# Patient Record
Sex: MALE
[De-identification: names, ages, dates, MRNs, and addresses within clinical notes are randomized per-mention and may not be internally consistent; named-entity substitution may affect disease eponyms.]

---

## 2017-10-20 ENCOUNTER — HOSPITAL ENCOUNTER (OUTPATIENT)
Dept: HOSPITAL 42 - ENDO | Age: 82
Discharge: HOME | End: 2017-10-20
Attending: INTERNAL MEDICINE
Payer: MEDICARE

## 2017-10-20 VITALS
TEMPERATURE: 97.5 F | SYSTOLIC BLOOD PRESSURE: 143 MMHG | DIASTOLIC BLOOD PRESSURE: 59 MMHG | HEART RATE: 48 BPM | RESPIRATION RATE: 16 BRPM | OXYGEN SATURATION: 98 %

## 2017-10-20 VITALS — BODY MASS INDEX: 23.6 KG/M2

## 2017-10-20 DIAGNOSIS — K21.0: ICD-10-CM

## 2017-10-20 DIAGNOSIS — K44.9: ICD-10-CM

## 2017-10-20 DIAGNOSIS — R63.4: ICD-10-CM

## 2017-10-20 DIAGNOSIS — K26.9: ICD-10-CM

## 2017-10-20 DIAGNOSIS — K90.0: Primary | ICD-10-CM

## 2017-10-20 DIAGNOSIS — K29.50: ICD-10-CM

## 2018-02-09 ENCOUNTER — HOSPITAL ENCOUNTER (OUTPATIENT)
Dept: HOSPITAL 42 - ENDO | Age: 83
Discharge: HOME | End: 2018-02-09
Attending: INTERNAL MEDICINE
Payer: MEDICARE

## 2018-02-09 VITALS — BODY MASS INDEX: 23.6 KG/M2

## 2018-02-09 VITALS
OXYGEN SATURATION: 98 % | DIASTOLIC BLOOD PRESSURE: 70 MMHG | SYSTOLIC BLOOD PRESSURE: 166 MMHG | HEART RATE: 43 BPM | RESPIRATION RATE: 16 BRPM | TEMPERATURE: 97.7 F

## 2018-02-09 DIAGNOSIS — K64.8: ICD-10-CM

## 2018-02-09 DIAGNOSIS — K57.30: Primary | ICD-10-CM

## 2018-02-09 PROCEDURE — 88305 TISSUE EXAM BY PATHOLOGIST: CPT

## 2018-02-09 PROCEDURE — 88342 IMHCHEM/IMCYTCHM 1ST ANTB: CPT

## 2018-02-09 PROCEDURE — 45380 COLONOSCOPY AND BIOPSY: CPT

## 2018-04-03 ENCOUNTER — HOSPITAL ENCOUNTER (OUTPATIENT)
Dept: HOSPITAL 42 - ENDO | Age: 83
Discharge: HOME | End: 2018-04-03
Attending: INTERNAL MEDICINE
Payer: MEDICARE

## 2018-04-03 VITALS
SYSTOLIC BLOOD PRESSURE: 144 MMHG | TEMPERATURE: 98 F | OXYGEN SATURATION: 97 % | HEART RATE: 40 BPM | DIASTOLIC BLOOD PRESSURE: 61 MMHG | RESPIRATION RATE: 15 BRPM

## 2018-04-03 VITALS — BODY MASS INDEX: 23.6 KG/M2

## 2018-04-03 DIAGNOSIS — R13.10: ICD-10-CM

## 2018-04-03 DIAGNOSIS — K22.70: ICD-10-CM

## 2018-04-03 DIAGNOSIS — K31.7: Primary | ICD-10-CM

## 2018-04-03 DIAGNOSIS — Z86.73: ICD-10-CM

## 2018-04-03 DIAGNOSIS — K22.2: ICD-10-CM

## 2018-04-03 DIAGNOSIS — K29.50: ICD-10-CM

## 2018-04-03 DIAGNOSIS — K90.0: ICD-10-CM

## 2018-04-03 DIAGNOSIS — I10: ICD-10-CM

## 2018-04-03 PROCEDURE — 88312 SPECIAL STAINS GROUP 1: CPT

## 2018-04-03 PROCEDURE — 88342 IMHCHEM/IMCYTCHM 1ST ANTB: CPT

## 2018-04-03 PROCEDURE — 88305 TISSUE EXAM BY PATHOLOGIST: CPT

## 2018-04-03 PROCEDURE — 43239 EGD BIOPSY SINGLE/MULTIPLE: CPT

## 2018-11-24 ENCOUNTER — HOSPITAL ENCOUNTER (EMERGENCY)
Dept: HOSPITAL 42 - ED | Age: 83
Discharge: HOME | End: 2018-11-24
Payer: MEDICARE

## 2018-11-24 VITALS — TEMPERATURE: 98.2 F

## 2018-11-24 VITALS — OXYGEN SATURATION: 96 % | HEART RATE: 52 BPM | SYSTOLIC BLOOD PRESSURE: 174 MMHG | DIASTOLIC BLOOD PRESSURE: 84 MMHG

## 2018-11-24 VITALS — RESPIRATION RATE: 18 BRPM

## 2018-11-24 VITALS — BODY MASS INDEX: 23.6 KG/M2

## 2018-11-24 DIAGNOSIS — I25.10: ICD-10-CM

## 2018-11-24 DIAGNOSIS — Z95.5: ICD-10-CM

## 2018-11-24 DIAGNOSIS — I10: Primary | ICD-10-CM

## 2018-11-24 NOTE — ED PDOC
Arrival/HPI





- General


Historian: Patient





- History of Present Illness


Narrative History of Present Illness (Text): 





11/24/18 09:55





Patient is a 83 year old male with past medical history of Hypertension, CAD s/p

stent, hyperlipidemia who presented to the emergency department for elevated 

blood pressure. Patient states that he just recently got a BP machine at home. 

He took his medication this morning and took his blood pressure approximately 10

minutes later. His SBP reading was 209 at that time. He called his PMD who 

recommended coming to the ED for evaluation. Patient offers no other complaints 

at this time. He denies headaches, dizziness, cp, palpitations, sob, abdominal 

pain, urinary symptoms. 








Time/Duration: Prior to Arrival


Symptom Onset: Sudden


Symptom Course: Unchanged





<Claritza Hoffmann - Last Filed: 11/24/18 10:22>





<Jax Guadarrama - Last Filed: 11/24/18 11:15>





- General


Chief Complaint: High Blood Pressure


Time Seen by Provider: 11/24/18 09:40





Past Medical History





- Provider Review


Nursing Documentation Reviewed: Yes





- Infectious Disease


Hx of Infectious Diseases: None





- Tetanus Immunization


Tetanus Immunization: Unknown





- Cardiac


Hx Hypertension: Yes


Hx Pacemaker: No





- Neurological


Hx Paralysis: No





- Hematological/Oncological


Hx Blood Transfusions: No





- Musculoskeletal/Rheumatological


Hx Musculoskeletal Disorders: No





- Psychiatric


Hx Emotional Abuse: No


Hx Physical Abuse: No


Hx Substance Use: No





- Surgical History


Hx Cardiac Catheterization: Yes (with stents)





- Anesthesia


Hx Anesthesia: Yes


Hx Anesthesia Reactions: No


Hx Malignant Hyperthermia: No





- Suicidal Assessment


Feels Threatened In Home Enviroment: No





<Claritza Hoffmann - Last Filed: 11/24/18 10:22>





Family/Social History





- Physician Review


Nursing Documentation Reviewed: Yes


Smoking Status: Never Smoked


Hx Alcohol Use: Yes (BEER OCCASIONALLY)


Hx Substance Use: No





<Claritza Hoffmann - Last Filed: 11/24/18 10:22>


Family/Social History: No Known Family HX





<Jax Guadarrama - Last Filed: 11/24/18 11:15>





Allergies/Home Meds





<Claritza Hoffmann - Last Filed: 11/24/18 10:22>





<Jax Guadarrama - Last Filed: 11/24/18 11:15>


Allergies/Adverse Reactions: 


Allergies





No Known Allergies Allergy (Verified 01/16/17 11:09)


   








Home Medications: 


                                    Home Meds











 Medication  Instructions  Recorded  Confirmed


 


RX: Metoprolol Succinate 25 mg PO DAILY 06/20/12 11/24/18


 


Ferrous Gluconate [Fergon] 270 mg PO DAILY 10/13/17 11/24/18


 


Folic Acid 1 mg PO BID 10/13/17 11/24/18


 


Tamsulosin [Flomax] 0.4 mg PO DAILY 10/13/17 11/24/18


 


RX: Omeprazole 20 mg PO DAILY 10/20/17 11/24/18


 


Aspirin [Ecotrin] 81 mg PO DAILY 01/03/18 11/24/18


 


Multivit-Minerals/FA/Lycopene [One 1 tab PO DAILY 01/03/18 11/24/18





Daily For Men Tablet]   


 


RX: Atorvastatin [Lipitor] 20 mg PO QOTHERDAY 01/03/18 11/24/18


 


RX: Docusate Sodium [Stool 100 mg PO DAILY 01/03/18 11/24/18





Softener]   


 


RX: Losartan Potassium [Cozaar] 100 mg PO DAILY 01/03/18 11/24/18














Review of Systems





- Physician Review


All systems were reviewed & negative as marked: Yes





- Review of Systems


Constitutional: Normal.  absent: Fatigue


Eyes: Normal.  absent: Vision Changes


Respiratory: Normal.  absent: SOB, Cough


Cardiovascular: Normal.  absent: Chest Pain, Palpitations, Edema


Gastrointestinal: Normal.  absent: Abdominal Pain, Constipation, Diarrhea, 

Nausea, Vomiting


Genitourinary Male: Normal.  absent: Dysuria, Frequency, Hematuria


Musculoskeletal: Normal.  absent: Back Pain, Neck Pain


Skin: Normal


Neurological: Normal.  absent: Headache, Dizziness, Focal Weakness





<Claritza Hoffmann - Last Filed: 11/24/18 10:22>





Physical Exam


Vital Signs Reviewed: Yes





Vital Signs











  Temp Pulse Resp BP Pulse Ox


 


 11/24/18 09:33  98.4 F  56 L  18  162/80 H  98


 


 11/24/18 09:17  98.4 F  56 L  18  162/80 H  98











Temperature: Afebrile


Blood Pressure: Hypertensive


Pulse: Bradycardic


Respiratory Rate: Normal


Appearance: Positive for: Well-Appearing, Non-Toxic, Comfortable


Pain Distress: None


Mental Status: Positive for: Alert and Oriented X 3





- Systems Exam


Head: Present: Atraumatic, Normocephalic


Pupils: Present: PERRL


Extroacular Muscles: Present: EOMI


Conjunctiva: Present: Normal


Mouth: Present: Moist Mucous Membranes


Nose (Internal): Present: Normal Inspection


Neck: Present: Normal Range of Motion


Respiratory/Chest: Present: Clear to Auscultation, Good Air Exchange.  No: 

Respiratory Distress, Accessory Muscle Use


Cardiovascular: Present: Regular Rate and Rhythm, Murmurs, Normal S1, S2


Abdomen: Present: Distention (mildly distended), Normal Bowel Sounds.  No: 

Tenderness, Peritoneal Signs, Rebound, Guarding


Back: Present: Normal Inspection.  No: CVA Tenderness


Upper Extremity: Present: Normal Inspection


Lower Extremity: Present: Normal Inspection


Neurological: Present: CN II-XII Intact


Skin: Present: Warm, Dry, Normal Color


Psychiatric: Present: Alert, Oriented x 3





<Claritza Hoffmann - Last Filed: 11/24/18 10:22>





Vital Signs











  Temp Pulse Resp BP Pulse Ox


 


 11/24/18 10:29  98.2 F  52 L  18  174/84 H  96


 


 11/24/18 09:33  98.4 F  56 L  18  162/80 H  98


 


 11/24/18 09:17  98.4 F  56 L  18  162/80 H  98














<Jax Guadarrama - Last Filed: 11/24/18 11:15>





Medical Decision Making


ED Course and Treatment: 





11/24/18 10:01





Patient seen and examined at bedside. Patient is asymptomatic, offering no 

complaints at this time. 





11/24/18 10:28





Patient to be discharged home. Instructed to follow up with PMD within 2-3 days.

 





<Claritza Hoffmann - Last Filed: 11/24/18 10:22>


ED Course and Treatment: 








11/24/18 10:05


Seen and examined with resident. 83 year old male presents with elevated blood 

pressure, spontaneously improving. On exam, no distress.








<Jax Guadarrama - Last Filed: 11/24/18 11:15>





- PA / NP / Resident Statement


MD/DO has reviewed & agrees with the documentation as recorded.


MD/DO has examined the patient and agrees with the treatment plan.





- Scribe Statement


The provider has reviewed the documentation as recorded by the Dominiqueibveronica Crabtree





All medical record entries made by the Scribe were at my direction and 

personally dictated by me. I have reviewed the chart and agree that the record 

accurately reflects my personal performance of the history, physical exam, 

medical decision making, and the department course for this patient. I have also

 personally directed, reviewed, and agree with the discharge instructions and 

disposition.








<Jax Guadarrama - Last Filed: 11/24/18 11:15>





Disposition/Present on Arrival





- Present on Arrival


Any Indicators Present on Arrival: No


History of DVT/PE: No


History of Uncontrolled Diabetes: No


Urinary Catheter: No


History of Decub. Ulcer: No


History Surgical Site Infection Following: CABG - Mediastinitis, None





- Disposition


Have Diagnosis and Disposition been Completed?: Yes


Disposition Time: 10:19


Patient Plan: Discharge





<ShunClaritza - Last Filed: 11/24/18 10:22>





<Jax Guadarrama - Last Filed: 11/24/18 11:15>





- Disposition


Diagnosis: 


 Hypertension





Disposition: HOME/ ROUTINE


Condition: GOOD


Discharge Instructions (ExitCare):  High Blood Pressure in Adults, High Blood Pr

essure (DC), Low Salt Diet, High Blood Pressure Emergencies


Additional Instructions: 


- Please follow up with PMD within 2-3 days for BP check and follow up


- If any worsening of symptoms, return to the emergency department





Referrals: 


Mikel Escalante MD [Family Provider] - Follow up with primary


Forms:  [facility] (English)

## 2019-02-11 ENCOUNTER — HOSPITAL ENCOUNTER (INPATIENT)
Dept: HOSPITAL 42 - ED | Age: 84
LOS: 2 days | Discharge: NURSING, REHAB OR LONG-TERM CARE | DRG: 66 | End: 2019-02-13
Attending: INTERNAL MEDICINE | Admitting: INTERNAL MEDICINE
Payer: MEDICARE

## 2019-02-11 VITALS — BODY MASS INDEX: 26.2 KG/M2

## 2019-02-11 VITALS — OXYGEN SATURATION: 99 %

## 2019-02-11 DIAGNOSIS — Z79.899: ICD-10-CM

## 2019-02-11 DIAGNOSIS — Z79.82: ICD-10-CM

## 2019-02-11 DIAGNOSIS — K90.0: ICD-10-CM

## 2019-02-11 DIAGNOSIS — E78.5: ICD-10-CM

## 2019-02-11 DIAGNOSIS — I69.392: ICD-10-CM

## 2019-02-11 DIAGNOSIS — I25.10: ICD-10-CM

## 2019-02-11 DIAGNOSIS — I10: ICD-10-CM

## 2019-02-11 DIAGNOSIS — I63.9: Primary | ICD-10-CM

## 2019-02-11 DIAGNOSIS — Z95.5: ICD-10-CM

## 2019-02-11 DIAGNOSIS — R29.700: ICD-10-CM

## 2019-02-11 DIAGNOSIS — I65.23: ICD-10-CM

## 2019-02-11 LAB
ALBUMIN SERPL-MCNC: 3.8 G/DL (ref 3–4.8)
ALBUMIN/GLOB SERPL: 1.4 {RATIO} (ref 1.1–1.8)
ALT SERPL-CCNC: 17 U/L (ref 7–56)
APTT BLD: 29.6 SECONDS (ref 26.9–38.3)
AST SERPL-CCNC: 31 U/L (ref 17–59)
BASOPHILS # BLD AUTO: 0.01 K/MM3 (ref 0–2)
BASOPHILS NFR BLD: 0.1 % (ref 0–3)
BUN SERPL-MCNC: 26 MG/DL (ref 7–21)
CALCIUM SERPL-MCNC: 8.9 MG/DL (ref 8.4–10.5)
EOSINOPHIL # BLD: 0.1 10*3/UL (ref 0–0.7)
EOSINOPHIL NFR BLD: 0.9 % (ref 1.5–5)
ERYTHROCYTE [DISTWIDTH] IN BLOOD BY AUTOMATED COUNT: 14.2 % (ref 11.5–14.5)
GFR NON-AFRICAN AMERICAN: 53
HDLC SERPL-MCNC: 32 MG/DL (ref 29–60)
HGB BLD-MCNC: 15.2 G/DL (ref 14–18)
INR PPP: 0.99
LDLC SERPL-MCNC: 74 MG/DL (ref 0–129)
LYMPHOCYTES # BLD: 2.7 10*3/UL (ref 1.2–3.4)
LYMPHOCYTES NFR BLD AUTO: 32 % (ref 22–35)
MCH RBC QN AUTO: 28.8 PG (ref 25–35)
MCHC RBC AUTO-ENTMCNC: 32.8 G/DL (ref 31–37)
MCV RBC AUTO: 87.9 FL (ref 80–105)
MONOCYTES # BLD AUTO: 0.7 10*3/UL (ref 0.1–0.6)
MONOCYTES NFR BLD: 7.7 % (ref 1–6)
PLATELET # BLD: 199 10^3/UL (ref 120–450)
PMV BLD AUTO: 10.4 FL (ref 7–11)
PROTHROMBIN TIME: 11.2 SECONDS (ref 9.4–12.5)
RBC # BLD AUTO: 5.28 10^6/UL (ref 3.5–6.1)
TROPONIN I SERPL-MCNC: < 0.01 NG/ML
WBC # BLD AUTO: 8.6 10^3/UL (ref 4.5–11)

## 2019-02-11 RX ADMIN — METOPROLOL SUCCINATE SCH: 50 TABLET, EXTENDED RELEASE ORAL at 10:04

## 2019-02-11 RX ADMIN — PANTOPRAZOLE SODIUM SCH: 40 TABLET, DELAYED RELEASE ORAL at 10:51

## 2019-02-11 NOTE — CARD
--------------- APPROVED REPORT --------------





Date of service: 02/11/2019



EKG Measurement

Heart [id number]

[id number]

[id number]

[id number]

[id number]



<Conclusion>

Sinus bradycardia with premature atrial complexes with aberrant 

conduction

Abnormal ECG

## 2019-02-11 NOTE — ED PDOC
Arrival/HPI





- General


Chief Complaint: Weakness/Neurological Deficit


Time Seen by Provider: 02/11/19 04:51


Historian: Patient, Spouse





- History of Present Illness


Narrative History of Present Illness (Text): 


02/11/19 04:57


Alexandr Neff is an 83 year old male, whose past medical history includes CAD 

with cardiac stents, hypertension, hyperlipidemia, and Celiac's disease, who 

presents to the Emergency department accompanied by wife complaining of 

paresthesias. Patient states while driving at 14:30 yesterday he started 

experiencing paresthesias to his right arm and right leg. Patient denies any 

numbness, headache, dizziness, vision changes, chest pain, shortness of breath, 

nausea, vomiting, back pain, neck pain, or any other complaints. 


Symptom Onset: Gradual


Symptom Course: Unchanged


Activities at Onset: Light


Context: Home





Past Medical History





- Provider Review


Nursing Documentation Reviewed: Yes





- Infectious Disease


Hx of Infectious Diseases: None





- Tetanus Immunization


Tetanus Immunization: Unknown





- Cardiac


Hx Hypertension: Yes


Hx Pacemaker: No





- Neurological


Hx Paralysis: No





- Hematological/Oncological


Hx Blood Transfusions: No





- Musculoskeletal/Rheumatological


Hx Musculoskeletal Disorders: No





- Psychiatric


Hx Emotional Abuse: No


Hx Physical Abuse: No


Hx Substance Use: No





- Surgical History


Hx Cardiac Catheterization: Yes (with stents)





- Anesthesia


Hx Anesthesia: Yes


Hx Anesthesia Reactions: No


Hx Malignant Hyperthermia: No





- Suicidal Assessment


Feels Threatened In Home Enviroment: No





Family/Social History





- Physician Review


Nursing Documentation Reviewed: Yes


Family/Social History: Unknown Family HX


Smoking Status: Never Smoked


Hx Alcohol Use: Yes (BEER OCCASIONALLY)


Hx Substance Use: No





Allergies/Home Meds


Allergies/Adverse Reactions: 


Allergies





No Known Allergies Allergy (Verified 02/11/19 04:39)


   








Home Medications: 


                                    Home Meds











 Medication  Instructions  Recorded  Confirmed


 


Metoprolol Succinate 50 mg PO DAILY 06/20/12 02/11/19


 


Ferrous Gluconate [Fergon] 270 mg PO DAILY 10/13/17 02/11/19


 


Folic Acid 1 mg PO BID 10/13/17 02/11/19


 


Tamsulosin [Flomax] 0.4 mg PO DAILY 10/13/17 02/11/19


 


Omeprazole 20 mg PO DAILY 10/20/17 02/11/19


 


Aspirin [Ecotrin] 81 mg PO DAILY 01/03/18 02/11/19


 


Atorvastatin [Lipitor] 20 mg PO QOTHERDAY 01/03/18 02/11/19


 


Docusate Sodium [Stool Softener] 100 mg PO DAILY 01/03/18 02/11/19


 


Losartan Potassium [Cozaar] 100 mg PO DAILY 01/03/18 02/11/19


 


Multivit-Minerals/FA/Lycopene [One 1 tab PO DAILY 01/03/18 02/11/19





Daily For Men Tablet]   


 


amLODIPine [Norvasc] 10 mg PO DAILY 02/11/19 02/11/19














Review of Systems





- Physician Review


All systems were reviewed & negative as marked: Yes





- Review of Systems


Constitutional: Normal.  absent: Fevers


Eyes: Normal


ENT: Normal


Respiratory: Normal.  absent: SOB, Cough


Cardiovascular: Normal.  absent: Chest Pain


Gastrointestinal: Normal.  absent: Abdominal Pain, Diarrhea, Nausea, Vomiting


Genitourinary Male: Normal


Musculoskeletal: Normal


Skin: Normal


Neurological: Normal


Endocrine: Normal


Hemo/Lymphatic: Normal


Psychiatric: Normal





Physical Exam


Vital Signs Reviewed: Yes





Vital Signs











  Temp Pulse Resp BP Pulse Ox


 


 02/11/19 04:50  97.6 F    


 


 02/11/19 04:47   53 L  18  145/77  97











Temperature: Afebrile


Blood Pressure: Normal


Pulse: Regular


Respiratory Rate: Normal


Appearance: Positive for: Well-Appearing, Non-Toxic, Comfortable


Pain Distress: None


Mental Status: Positive for: Alert and Oriented X 3


Finger Stick Blood Glucose: 142





- Systems Exam


Head: Present: Atraumatic, Normocephalic


Pupils: Present: PERRL


Extroacular Muscles: Present: EOMI


Conjunctiva: Present: Normal


Mouth: Present: Moist Mucous Membranes


Neck: Present: Normal Range of Motion


Respiratory/Chest: Present: Clear to Auscultation, Good Air Exchange.  No: 

Respiratory Distress, Accessory Muscle Use


Cardiovascular: Present: Regular Rate and Rhythm, Normal S1, S2.  No: Murmurs


Abdomen: No: Tenderness, Distention, Peritoneal Signs


Back: Present: Normal Inspection


Upper Extremity: Present: Normal Inspection, Normal ROM, NORMAL PULSES, 

Neurovascularly Intact, Capillary Refill < 2s.  No: Cyanosis, Edema, Tenderness,

 Swelling, Erythema, Temperature Abnormalties, Deformity


Lower Extremity: Present: Normal Inspection.  No: Edema


Neurological: Present: GCS=15, CN II-XII Intact, Speech Normal, Motor Func 

Grossly Intact, Normal Sensory Function, Normal Cerebellar Funct, Memory Normal


Skin: Present: Warm, Dry, Normal Color.  No: Rashes


Psychiatric: Present: Alert, Oriented x 3, Normal Insight, Normal Concentration





Medical Decision Making


ED Course and Treatment: 


02/11/19 04:57


Impression:


83 year old male complaining of right arm and right leg paresthesias since 14:30

 yesterday.





Plan:


-- CT Head w/o contrast


-- EKG


-- Chest X-ray


-- Labs, lipid panel, troponin


-- Reassess and disposition





Progress Notes:


Reviewed EKG, sinus bradycardia at 52 bpm. Occasional PAC. Septal infarct. No 

acute changes.





02/11/19 06:00


Reviewed Chest X-ray, shows no acute processes.


02/11/19 06:32


Case was discussed with .Accepts to his service/ on consult.





- EKG Interpretation


Interpreted by ED Physician: Yes


Type: 12 lead EKG





NIHSS Scale ([place])


Time Performed: 05:00





- How Severe is the Stoke


  ** Baseline


Level of Consciousness: 0=Alert


LOC to Questions: 0=Both comments correct


LOC to commands: 0=Obeys both correctly


Best Gaze: 0=Normal


Visual: 0=No visual loss


Facial: 0=Normal


Motor Arm - Left: 0=No drift


Motor Arm - Right: 0=No drift


Motor Leg - Left: 0=No drift


Motor Leg - Right: 0=No drift


Limb Ataxia: 0=Absent


Sensory: 0=Normal


Best Language: 0=No aphasia


Dysarthia: 0=Normal articulation


Extinction & Inattention (Neglect): 0=Normal, no object


Score: 0


Risk Level: No Stroke Risk





- Scribe Statement


The provider has reviewed the documentation as recorded by the Asad Pedraza





Provider Scribe Attestation:


All medical record entries made by the Dominiqueibveronica were at my direction and 

personally dictated by me. I have reviewed the chart and agree that the record 

accurately reflects my personal performance of the history, physical exam, 

medical decision making, and the department course for this patient. I have also

 personally directed, reviewed, and agree with the discharge instructions and 

disposition.








Disposition/Present on Arrival





- Present on Arrival


Any Indicators Present on Arrival: No


History of DVT/PE: No


History of Uncontrolled Diabetes: No


Urinary Catheter: No


History of Decub. Ulcer: No


History Surgical Site Infection Following: CABG - Mediastinitis, None





- Disposition


Have Diagnosis and Disposition been Completed?: Yes


Diagnosis: 


 Paresthesias/numbness





Disposition: HOSPITALIZED


Disposition Time: 06:32


Patient Plan: Observation


Condition: STABLE


Forms:  [employer] (English)

## 2019-02-11 NOTE — US
PROCEDURE:  Bilateral carotid artery duplex ultrasound 



HISTORY:

Carotid stenosis TIA



PHYSICIAN(S):  Jeffery Powers MD.



TECHNIQUE:

Duplex sonography and color-flow Doppler were used to evaluate the 

carotid bifurcations and limited segments of the vertebral arteries 

bilaterally.



FINDINGS:

There is mild smooth heterogeneous plaque noted at the carotid 

bifurcations bilaterally. The peak systolic velocity in the proximal 

right internal carotid artery is 82 cm/sec. This corresponds to a 20 

to 39% proximal right ICA stenosis. Normal systolic velocities are 

noted in the proximal right external carotid artery. There is 

antegrade flow in the right vertebral artery.



The peak systolic velocity in the proximal left internal carotid 

artery is 78 cm/sec. This corresponds to a 20 to 39% proximal left 

ICA stenosis. Normal systolic velocities are noted in the proximal 

left external carotid artery. There is antegrade flow in the left 

vertebral artery.



IMPRESSION:

1. Bilateral 20-39% proximal ICA stenoses.



2. Antegrade flow in both vertebral arteries.

## 2019-02-11 NOTE — CT
Date of service: 



02/11/2019



PROCEDURE:  CT HEAD WITHOUT CONTRAST.



HISTORY:

numbness



COMPARISON:

None available.



TECHNIQUE:

Axial computed tomography images were obtained through the head/brain 

without intravenous contrast.  



Radiation dose:



Total exam DLP = 867.26 mGy-cm.



This CT exam was performed using one or more of the following dose 

reduction techniques: Automated exposure control, adjustment of the 

mA and/or kV according to patient size, and/or use of iterative 

reconstruction technique.



FINDINGS:



HEMORRHAGE:

No intracranial hemorrhage. 



BRAIN:

No mass effect or edema.  Chronic microvascular changes are seen.  

There is moderate atrophy.



VENTRICLES:

Unremarkable. No hydrocephalus. 



CALVARIUM:

Unremarkable.



PARANASAL SINUSES:

Unremarkable as visualized. No significant inflammatory changes.



MASTOID AIR CELLS:

Unremarkable as visualized. No inflammatory changes.



OTHER FINDINGS:

The report concurs with the preliminary [facility] report



IMPRESSION:

No acute intracranial findings

## 2019-02-11 NOTE — RAD
Date of service: 



02/11/2019



HISTORY:

 medical clearance 



COMPARISON:

10/02/2014. 



FINDINGS:



LUNGS:

No active pulmonary disease.



PLEURA:

No significant pleural effusion identified, no pneumothorax apparent.



CARDIOVASCULAR:

Atherosclerotic calcifications identified primarily aortic arch.



 No radiographic findings to suggest acute or significant 

cardiovascular disease.



OSSEOUS STRUCTURES:

No significant abnormalities.



VISUALIZED UPPER ABDOMEN:

Normal.



OTHER FINDINGS:

None.



IMPRESSION:

No active disease. No significant interval change compared to the 

prior examination(s).

## 2019-02-11 NOTE — CON
DATE:  02/11/2019



HISTORY OF PRESENT ILLNESS:  This is an 83-year-old male with past medical

of coronary artery disease, cardiac stents, hypertension, hyperlipidemia,

and celiac disease.  He came to the hospital with his wife with complaint

of numbness of right upper extremity and right lower extremity.  No neck

pain.  No back pain.  Denies any dizziness.  No visual changes.



PAST MEDICAL HISTORY:  As above.



SOCIAL HISTORY:  Does not smoke.  Does not drink.



ALLERGIES:  NO KNOWN DRUG ALLERGIES.



HOME MEDICATIONS:  Metoprolol, Flomax, omeprazole, Ecotrin, Lipitor,

Cozaar, and Norvasc.



REVIEW OF SYSTEMS:  A 10-point review of systems was negative.



PHYSICAL EXAMINATION:

VITAL SIGNS:  Blood pressure 145/77.

HEENT:  Normocephalic and atraumatic.

NECK:  Supple.

NEUROLOGIC:  Awake, alert and oriented x3.  No aphasia.  Cranial nerves II

through XII were tested.  Pupils reactive.  EOM intact.  No facial

asymmetry.  Tongue midline.  Motor examination; moves all the extremities

equally.  Tone normal.  Deep tendon reflexes 1+.  Plantars are downgoing. 

Sensory appears intact.  Cerebellar gait deferred.



IMPRESSION AND PLAN:  Right-sided paraesthesia.  CAT scan of the head was

negative.  Workup in progress, we will find out and continue present

management and continue aspirin.







__________________________________________

Jun Faulkner MD





DD:  02/11/2019 13:29:57

DT:  02/11/2019 15:15:30

Job # [id number]

## 2019-02-11 NOTE — CP.PCM.HP
<Umesh Flood - Last Filed: 19 14:52>





History of Present Illness





- History of Present Illness


History of Present Illness: 





H&P for Dr. Schneider:





83-year-old male with past medical history of CVA (L sided residual facial 

droop) , CAD with stents, hypertension, hyperlipidemia, celiac disease presents 

complaining of weakness and paresthesias of his right side.  Patient states that

she was riding a treadmill yesterday afternoon and was doing fine.  Patient 

subsequently went driving towards the park when he started feeling right-sided 

symptoms including R sided paresthesias and weakness.  Patient denies taking 

anything for this.  When his symptoms did not go away patient decided to come to

the ED afraid that he may have a stroke. Patient denies any falls or hitting his

head. Patient denies any headache, dizziness, visual changes, shortness of 

breath, chest pain, palpitations, abdominal pain, nausea, vomiting, or urinary 

symptoms.





12 point ROS performed and negative other than stated above. 





PMH: As above


PSH: Denies


Allergies: No known allergies


SH: Denies any drinking, smoking, or drugs


FH: Both his parents  of cancer, father with lung cancer, mother with 

ovarian cancer









































Present on Admission





- Present on Admission


Any Indicators Present on Admission: No





Review of Systems





- Review of Systems


All systems: reviewed and no additional remarkable complaints except





Past Patient History





- Infectious Disease


Hx of Infectious Diseases: None





- Tetanus Immunizations


Tetanus Immunization: Unknown





- Past Social History


Smoking Status: Never Smoked





- CARDIAC


Hx Hypertension: Yes


Hx Pacemaker: No





- NEUROLOGICAL


Hx Paralysis: No





- HEMATOLOGICAL/ONCOLOGICAL


Hx Blood Transfusions: No





- MUSCULOSKELETAL/RHEUMATOLOGICAL


Hx Musculoskeletal Disorders: No





- PSYCHIATRIC


Hx Emotional Abuse: No


Hx Physical Abuse: No


Hx Substance Use: No





- SURGICAL HISTORY


Hx Cardiac Catheterization: Yes (with stents)





- ANESTHESIA


Hx Anesthesia: Yes


Hx Anesthesia Reactions: No


Hx Malignant Hyperthermia: No





Meds


Allergies/Adverse Reactions: 


                                    Allergies











Allergy/AdvReac Type Severity Reaction Status Date / Time


 


No Known Allergies Allergy   Verified 19 04:39














Physical Exam





- Constitutional


Appears: No Acute Distress





- Head Exam


Head Exam: ATRAUMATIC, NORMOCEPHALIC





- Eye Exam


Eye Exam: EOMI





- ENT Exam


ENT Exam: Mucous Membranes Moist





- Respiratory Exam


Respiratory Exam: Clear to Auscultation Bilateral.  absent: Rales, Wheezes





- Cardiovascular Exam


Cardiovascular Exam: REGULAR RHYTHM, +S1, +S2





- GI/Abdominal Exam


GI & Abdominal Exam: Normal Bowel Sounds, Soft.  absent: Distended





- Extremities Exam


Extremities exam: Negative for: calf tenderness, pedal edema





- Neurological Exam


Neurological exam: Alert, CN II-XII Intact, Oriented x3


Additional comments: 





L sided residual facial droop from prior stroke. 


Motor strength R upper and lower ext 4/5 


                     L upper and lower ext 5/5


Sensation intact in all extremities 








- Psychiatric Exam


Psychiatric exam: Normal Mood





- Skin


Skin Exam: Dry, Warm





Results





- Vital Signs


Recent Vital Signs: 





                                Last Vital Signs











Temp  97.7 F   19 07:29


 


Pulse  50 L  19 12:58


 


Resp  16   19 12:58


 


BP  137/57 L  19 12:58


 


Pulse Ox  97   19 12:58














- Labs


Result Diagrams: 


                                 19 05:08





                                 19 05:08


Labs: 





                         Laboratory Results - last 24 hr











  19





  05:08 05:08 05:08


 


WBC  8.6  


 


RBC  5.28  


 


Hgb  15.2  


 


Hct  46.4  


 


MCV  87.9  


 


MCH  28.8  


 


MCHC  32.8  


 


RDW  14.2  


 


Plt Count  199  


 


MPV  10.4  


 


Neut % (Auto)  59.3  


 


Lymph % (Auto)  32.0  


 


Mono % (Auto)  7.7 H  


 


Eos % (Auto)  0.9 L  


 


Baso % (Auto)  0.1  


 


Lymph # (Auto)  2.7  


 


Mono # (Auto)  0.7 H  


 


Eos # (Auto)  0.1  


 


Baso # (Auto)  0.01  


 


Absolute Neuts (auto)  5.07  


 


PT   11.2 


 


INR   0.99 


 


APTT   29.6 


 


Sodium    136


 


Potassium    4.7


 


Chloride    105


 


Carbon Dioxide    24


 


Anion Gap    12


 


BUN    26 H


 


Creatinine    1.3


 


Est GFR ( Amer)    > 60


 


Est GFR (Non-Af Amer)    53


 


Random Glucose    143 H


 


Hemoglobin A1c   


 


Calcium    8.9


 


Total Bilirubin    0.6


 


AST    31


 


ALT    17


 


Alkaline Phosphatase    87


 


Troponin I    < 0.01


 


Total Protein    6.5


 


Albumin    3.8


 


Globulin    2.7


 


Albumin/Globulin Ratio    1.4


 


Triglycerides    124


 


Cholesterol    126 L


 


LDL Cholesterol Direct    74


 


HDL Cholesterol    32














  19





  05:08


 


WBC 


 


RBC 


 


Hgb 


 


Hct 


 


MCV 


 


MCH 


 


MCHC 


 


RDW 


 


Plt Count 


 


MPV 


 


Neut % (Auto) 


 


Lymph % (Auto) 


 


Mono % (Auto) 


 


Eos % (Auto) 


 


Baso % (Auto) 


 


Lymph # (Auto) 


 


Mono # (Auto) 


 


Eos # (Auto) 


 


Baso # (Auto) 


 


Absolute Neuts (auto) 


 


PT 


 


INR 


 


APTT 


 


Sodium 


 


Potassium 


 


Chloride 


 


Carbon Dioxide 


 


Anion Gap 


 


BUN 


 


Creatinine 


 


Est GFR ( Amer) 


 


Est GFR (Non-Af Amer) 


 


Random Glucose 


 


Hemoglobin A1c  6.2


 


Calcium 


 


Total Bilirubin 


 


AST 


 


ALT 


 


Alkaline Phosphatase 


 


Troponin I 


 


Total Protein 


 


Albumin 


 


Globulin 


 


Albumin/Globulin Ratio 


 


Triglycerides 


 


Cholesterol 


 


LDL Cholesterol Direct 


 


HDL Cholesterol 














Assessment & Plan





- Assessment and Plan (Free Text)


Assessment: 





1. R sided weakness and parathesia r/o CVA vs TIA 


2. CAD with stents


3  Hypertension


4. Hyperlipidemia


5. Celiac disease








Patient is still complaining of right-sided symptoms.  Continue with aspirin 

81mg for history of CAD, and CVA.  Follow-up with echocardiogram and carotid 

artery ultrasound ordered.   Follow-up with neurology consult and 

recommendations.  Continue with Lipitor for his hyperlipidemia.  Continue with 

metoprolol and losartan for his hypertension.  Patient had a CT of the head 

which was negative.  Patient had a chest x-ray which was negative as well. 

Continue with physical therapy.  Continue with heart healthy and gluten-free 

diet.  Continue with Protonix.  Continue to monitor for any changes.





Case and plan was reviewed and discussed with Dr. Schneider. 





<Clayton Schneider - Last Filed: 19 16:56>





Results





- Vital Signs


Recent Vital Signs: 





                                Last Vital Signs











Temp  97.7 F   19 07:29


 


Pulse  50 L  19 12:58


 


Resp  16   19 12:58


 


BP  137/57 L  19 12:58


 


Pulse Ox  97   19 12:58














- Labs


Result Diagrams: 


                                 19 05:08





                                 19 05:08


Labs: 





                         Laboratory Results - last 24 hr











  19





  05:08 05:08 05:08


 


WBC  8.6  


 


RBC  5.28  


 


Hgb  15.2  


 


Hct  46.4  


 


MCV  87.9  


 


MCH  28.8  


 


MCHC  32.8  


 


RDW  14.2  


 


Plt Count  199  


 


MPV  10.4  


 


Neut % (Auto)  59.3  


 


Lymph % (Auto)  32.0  


 


Mono % (Auto)  7.7 H  


 


Eos % (Auto)  0.9 L  


 


Baso % (Auto)  0.1  


 


Lymph # (Auto)  2.7  


 


Mono # (Auto)  0.7 H  


 


Eos # (Auto)  0.1  


 


Baso # (Auto)  0.01  


 


Absolute Neuts (auto)  5.07  


 


PT   11.2 


 


INR   0.99 


 


APTT   29.6 


 


Sodium    136


 


Potassium    4.7


 


Chloride    105


 


Carbon Dioxide    24


 


Anion Gap    12


 


BUN    26 H


 


Creatinine    1.3


 


Est GFR ( Amer)    > 60


 


Est GFR (Non-Af Amer)    53


 


Random Glucose    143 H


 


Hemoglobin A1c   


 


Calcium    8.9


 


Total Bilirubin    0.6


 


AST    31


 


ALT    17


 


Alkaline Phosphatase    87


 


Troponin I    < 0.01


 


Total Protein    6.5


 


Albumin    3.8


 


Globulin    2.7


 


Albumin/Globulin Ratio    1.4


 


Triglycerides    124


 


Cholesterol    126 L


 


LDL Cholesterol Direct    74


 


HDL Cholesterol    32














  19





  05:08


 


WBC 


 


RBC 


 


Hgb 


 


Hct 


 


MCV 


 


MCH 


 


MCHC 


 


RDW 


 


Plt Count 


 


MPV 


 


Neut % (Auto) 


 


Lymph % (Auto) 


 


Mono % (Auto) 


 


Eos % (Auto) 


 


Baso % (Auto) 


 


Lymph # (Auto) 


 


Mono # (Auto) 


 


Eos # (Auto) 


 


Baso # (Auto) 


 


Absolute Neuts (auto) 


 


PT 


 


INR 


 


APTT 


 


Sodium 


 


Potassium 


 


Chloride 


 


Carbon Dioxide 


 


Anion Gap 


 


BUN 


 


Creatinine 


 


Est GFR ( Amer) 


 


Est GFR (Non-Af Amer) 


 


Random Glucose 


 


Hemoglobin A1c  6.2


 


Calcium 


 


Total Bilirubin 


 


AST 


 


ALT 


 


Alkaline Phosphatase 


 


Troponin I 


 


Total Protein 


 


Albumin 


 


Globulin 


 


Albumin/Globulin Ratio 


 


Triglycerides 


 


Cholesterol 


 


LDL Cholesterol Direct 


 


HDL Cholesterol 














Assessment & Plan





- Assessment and Plan (Free Text)


Assessment: 





Pt seen and examined by me. I have reviewed the note of the medical resident and

I agree with it. I have discussed the assessment and plan with the resident. I 

have reviewed the medications and the last labs. Pt with R sided tingling with 

no weakness. He will need to have an evaluation for a TIA. I dont think he has a

TIA but may have a neuropathy. He denies back pain. He is on ASA for his CAD. He

will need Echo and Cartotid US for evaluation. CT of the head was negative. He 

will continue with Losartan for his HTN. He will be on Lipitor for his dysl

ipidemia. I spoke to his wife and updated her on the plan of care. Will need 

gluten free diet. Will get Neuro evaluation.

## 2019-02-12 VITALS — RESPIRATION RATE: 18 BRPM

## 2019-02-12 LAB
ALBUMIN SERPL-MCNC: 3.9 G/DL (ref 3–4.8)
ALBUMIN/GLOB SERPL: 1.4 {RATIO} (ref 1.1–1.8)
ALT SERPL-CCNC: 20 U/L (ref 7–56)
AST SERPL-CCNC: 34 U/L (ref 17–59)
BASOPHILS # BLD AUTO: 0.02 K/MM3 (ref 0–2)
BASOPHILS NFR BLD: 0.2 % (ref 0–3)
BUN SERPL-MCNC: 23 MG/DL (ref 7–21)
CALCIUM SERPL-MCNC: 9 MG/DL (ref 8.4–10.5)
EOSINOPHIL # BLD: 0.1 10*3/UL (ref 0–0.7)
EOSINOPHIL NFR BLD: 0.6 % (ref 1.5–5)
ERYTHROCYTE [DISTWIDTH] IN BLOOD BY AUTOMATED COUNT: 14.3 % (ref 11.5–14.5)
GFR NON-AFRICAN AMERICAN: 53
HGB BLD-MCNC: 15.5 G/DL (ref 14–18)
LYMPHOCYTES # BLD: 3.4 10*3/UL (ref 1.2–3.4)
LYMPHOCYTES NFR BLD AUTO: 30.9 % (ref 22–35)
MCH RBC QN AUTO: 28.2 PG (ref 25–35)
MCHC RBC AUTO-ENTMCNC: 32.1 G/DL (ref 31–37)
MCV RBC AUTO: 87.8 FL (ref 80–105)
MONOCYTES # BLD AUTO: 0.8 10*3/UL (ref 0.1–0.6)
MONOCYTES NFR BLD: 7 % (ref 1–6)
PLATELET # BLD: 201 10^3/UL (ref 120–450)
PMV BLD AUTO: 10.4 FL (ref 7–11)
RBC # BLD AUTO: 5.5 10^6/UL (ref 3.5–6.1)
WBC # BLD AUTO: 11.1 10^3/UL (ref 4.5–11)

## 2019-02-12 RX ADMIN — METOPROLOL SUCCINATE SCH MG: 50 TABLET, EXTENDED RELEASE ORAL at 09:55

## 2019-02-12 RX ADMIN — PANTOPRAZOLE SODIUM SCH MG: 40 TABLET, DELAYED RELEASE ORAL at 09:55

## 2019-02-12 NOTE — CP.PCM.PN
<Umesh Flood - Last Filed: 02/12/19 11:08>





Subjective





- Date & Time of Evaluation


Date of Evaluation: 02/12/19


Time of Evaluation: 07:10





- Subjective


Subjective: 





Medicine progress note for Dr. Schneider:





Patient seen and examined at bedside.  No acute events overnight.  Patient still

complaining of right-sided weakness along with paresthesia of both his upper and

lower extremities.  No other complaints.





12 point ROS performed and negative other than stated above.





Objective





- Vital Signs/Intake and Output


Vital Signs (last 24 hours): 


                                        











Temp Pulse Resp BP Pulse Ox


 


 97.6 F   77   19   160/70 H  99 


 


 02/12/19 06:00  02/12/19 09:55  02/12/19 06:00  02/12/19 09:55  02/11/19 16:00








Intake and Output: 


                                        











 02/12/19 02/12/19





 06:59 18:59


 


Intake Total 840 


 


Output Total 650 


 


Balance 190 














- Medications


Medications: 


                               Current Medications





Alprazolam (Xanax)  0.25 mg PO BID PRN


   PRN Reason: Anxiety


   Stop: 02/19/19 10:50


Amlodipine Besylate (Norvasc)  10 mg PO DAILY [facility]


   Last Admin: 02/12/19 09:55 Dose:  10 mg


Aspirin (Ecotrin)  81 mg PO DAILY [facility]


   Last Admin: 02/12/19 09:55 Dose:  81 mg


Atorvastatin Calcium (Lipitor)  20 mg PO QOTHERDAY [facility]


   Last Admin: 02/11/19 10:04 Dose:  Not Given


Docusate Sodium (Colace)  100 mg PO DAILY [facility]


   Last Admin: 02/12/19 09:55 Dose:  100 mg


Folic Acid (Folic Acid)  1 mg PO BID [facility]


   Last Admin: 02/12/19 09:55 Dose:  1 mg


Losartan Potassium (Cozaar)  100 mg PO DAILY [facility]


   Last Admin: 02/11/19 10:02 Dose:  Not Given


Metoprolol Succinate (Toprol Xl)  50 mg PO DAILY [facility]


   Last Admin: 02/12/19 09:55 Dose:  50 mg


Pantoprazole Sodium (Protonix Ec Tab)  40 mg PO DAILY [facility]


   Last Admin: 02/12/19 09:55 Dose:  40 mg


Tamsulosin HCl (Flomax)  0.4 mg PO HS [facility]


   Last Admin: 02/11/19 22:23 Dose:  0.4 mg











- Labs


Labs: 


                                        





                                 02/12/19 09:00 





                                 02/12/19 08:40 





                                        











PT  11.2 SECONDS (9.4-12.5)   02/11/19  05:08    


 


INR  0.99   02/11/19  05:08    


 


APTT  29.6 Seconds (26.9-38.3)   02/11/19  05:08    














- Constitutional


Appears: No Acute Distress





- Head Exam


Head Exam: ATRAUMATIC, NORMOCEPHALIC





- Eye Exam


Eye Exam: EOMI





- ENT Exam


ENT Exam: Mucous Membranes Moist





- Respiratory Exam


Respiratory Exam: Clear to Ausculation Bilateral.  absent: Rales, Wheezes





- Cardiovascular Exam


Cardiovascular Exam: REGULAR RHYTHM, +S1, +S2





- GI/Abdominal Exam


GI & Abdominal Exam: Soft.  absent: Distended, Tenderness





- Extremities Exam


Extremities Exam: absent: Calf Tenderness, Pedal Edema





- Neurological Exam


Neurological Exam: Alert, Awake, Oriented x3





- Psychiatric Exam


Psychiatric exam: Normal Mood





- Skin


Skin Exam: Dry, Warm





Assessment and Plan





- Assessment and Plan (Free Text)


Assessment: 





1. R sided weakness and parathesia r/o CVA vs TIA 


2. CAD with stents


3  Hypertension


4. Hyperlipidemia


5. Celiac disease








Patient still with right-sided symptoms. Physical therapy was ordered and 

recommended a brain MRI as the patient was off balance on the right side.  MRI 

ordered we will follow-up results. Continue with aspirin 81mg for history of 

CAD, and CVA.  Follow-up with echocardiogram.  Carotid artery ultrasound was 

neg.   Follow-up with neurology consult and recommendations.  Continue with 

Lipitor for his hyperlipidemia.  Continue with metoprolol and losartan for his 

hypertension.  Continue with physical therapy.  Continue with heart healthy and 

gluten-free diet.  Continue with Protonix.  Continue to monitor.





Case and plan was reviewed and discussed with Dr. Schneider. 





<Clayton Schneider - Last Filed: 02/12/19 19:43>





Objective





- Vital Signs/Intake and Output


Vital Signs (last 24 hours): 


                                        











Temp Pulse Resp BP Pulse Ox


 


 98.7 F   57 L  18   164/64 H  99 


 


 02/12/19 18:00  02/12/19 18:00  02/12/19 18:00  02/12/19 18:00  02/11/19 16:00








Intake and Output: 


                                        











 02/12/19 02/13/19





 18:59 06:59


 


Intake Total  720


 


Output Total  800


 


Balance  -80














- Medications


Medications: 


                               Current Medications





Alprazolam (Xanax)  0.25 mg PO BID PRN


   PRN Reason: Anxiety


   Stop: 02/19/19 10:50


   Last Admin: 02/12/19 11:47 Dose:  0.25 mg


Amlodipine Besylate (Norvasc)  10 mg PO DAILY [facility]


   Last Admin: 02/12/19 09:55 Dose:  10 mg


Aspirin (Ecotrin)  81 mg PO DAILY [facility]


   Last Admin: 02/12/19 09:55 Dose:  81 mg


Atorvastatin Calcium (Lipitor)  20 mg PO QOTHERDAY [facility]


   Last Admin: 02/11/19 10:04 Dose:  Not Given


Clopidogrel Bisulfate (Plavix)  75 mg PO DAILY [facility]


   Last Admin: 02/12/19 14:51 Dose:  75 mg


Docusate Sodium (Colace)  100 mg PO DAILY [facility]


   Last Admin: 02/12/19 09:55 Dose:  100 mg


Folic Acid (Folic Acid)  1 mg PO BID [facility]


   Last Admin: 02/12/19 17:33 Dose:  1 mg


Losartan Potassium (Cozaar)  100 mg PO DAILY [facility]


   Last Admin: 02/12/19 09:55 Dose:  100 mg


Metoprolol Succinate (Toprol Xl)  50 mg PO DAILY [facility]


   Last Admin: 02/12/19 09:55 Dose:  50 mg


Pantoprazole Sodium (Protonix Ec Tab)  40 mg PO DAILY [facility]


   Last Admin: 02/12/19 09:55 Dose:  40 mg


Tamsulosin HCl (Flomax)  0.4 mg PO HS [facility]


   Last Admin: 02/11/19 22:23 Dose:  0.4 mg











- Labs


Labs: 


                                        





                                 02/12/19 09:00 





                                 02/12/19 08:40 





                                        











PT  11.2 SECONDS (9.4-12.5)   02/11/19  05:08    


 


INR  0.99   02/11/19  05:08    


 


APTT  29.6 Seconds (26.9-38.3)   02/11/19  05:08    














Assessment and Plan





- Assessment and Plan (Free Text)


Assessment: 





Pt seen and examined by me. I have reviewed the note of the medical resident and

I agree with it. I have discussed the assessment and plan with the resident. I 

have reviewed the medications and the last labs. Pt continues to have R leg 

paratheisa. will get MRI to evaluate of the head. He has CAD and is on ASA. He i

s on Lsartan for his HTN. He will continue with Lipitor for his dyslipidemia. He

has an echo that is pending. He has not pain and no focal weakness. He is on a 

gluten free diet.

## 2019-02-12 NOTE — CP.PCM.APN
Subjective





- Date & Time of Evaluation


Date of Evaluation: 02/12/19


Time of Evaluation: 10:00





- Subjective


Subjective: 





pt seen and examined at bedside, sitting in chair with wife at his side 





Pt c/o right sided weakness and anxiety requesting xanax he takes at home





Review of Systems





- Review of Systems


All systems: reviewed and no additional remarkable complaints except





- Constitutional


Constitutional: Weakness


Additional comments: 





right sided





Objective





- Vital Signs/Intake and Output


Vital Signs (last 24 hours): 


                                        











Temp Pulse Resp BP Pulse Ox


 


 97.6 F   77   19   160/70 H  99 


 


 02/12/19 06:00  02/12/19 09:55  02/12/19 06:00  02/12/19 09:55  02/11/19 16:00








Intake and Output: 


                                        











 02/12/19 02/12/19





 06:59 18:59


 


Intake Total 840 


 


Output Total 650 


 


Balance 190 














- Medications


Medications: 


                               Current Medications





Alprazolam (Xanax)  0.25 mg PO BID PRN


   PRN Reason: Anxiety


   Stop: 02/19/19 10:50


Amlodipine Besylate (Norvasc)  10 mg PO DAILY [facility]


   Last Admin: 02/12/19 09:55 Dose:  10 mg


Aspirin (Ecotrin)  81 mg PO DAILY [facility]


   Last Admin: 02/12/19 09:55 Dose:  81 mg


Atorvastatin Calcium (Lipitor)  20 mg PO QOTHERDAY [facility]


   Last Admin: 02/11/19 10:04 Dose:  Not Given


Docusate Sodium (Colace)  100 mg PO DAILY [facility]


   Last Admin: 02/12/19 09:55 Dose:  100 mg


Folic Acid (Folic Acid)  1 mg PO BID [facility]


   Last Admin: 02/12/19 09:55 Dose:  1 mg


Losartan Potassium (Cozaar)  100 mg PO DAILY [facility]


   Last Admin: 02/11/19 10:02 Dose:  Not Given


Metoprolol Succinate (Toprol Xl)  50 mg PO DAILY [facility]


   Last Admin: 02/12/19 09:55 Dose:  50 mg


Pantoprazole Sodium (Protonix Ec Tab)  40 mg PO DAILY [facility]


   Last Admin: 02/12/19 09:55 Dose:  40 mg


Tamsulosin HCl (Flomax)  0.4 mg PO HS [facility]


   Last Admin: 02/11/19 22:23 Dose:  0.4 mg











- Labs


Labs: 


                                        





                                 02/12/19 09:00 





                                 02/12/19 08:40 





                                        











PT  11.2 SECONDS (9.4-12.5)   02/11/19  05:08    


 


INR  0.99   02/11/19  05:08    


 


APTT  29.6 Seconds (26.9-38.3)   02/11/19  05:08    














- Constitutional


Appears: Non-toxic, No Acute Distress





- Eye Exam


Pupil Exam: PERRL





- ENT Exam


ENT Exam: Mucous Membranes Moist





- Respiratory Exam


Respiratory Exam: NORMAL BREATHING PATTERN





- Cardiovascular Exam


Cardiovascular Exam: REGULAR RHYTHM, +S1, +S2





- GI/Abdominal Exam


GI & Abdominal Exam: Soft, Normal Bowel Sounds





- Neurological Exam


Neurological Exam: Alert, Awake


Additional comments: 





pt weaker on right side than left 





- Skin


Skin Exam: Dry, Intact





Assessment and Plan





- Assessment and Plan (Free Text)


Plan: 





83 yr old white male with pmh sig for left cva, htn, hld, celiac disease, who 

presented to the ED after sudden onset of right sided weakness. pt is now being 

evaluated by neurologist with workup in progress. 


per discussion with PKERRY and resident, pt unsafe for dc home, will order MRI to 

assess for ? cerebellar injury 








#acute right sided paresthesia 


r/o stroke 


neurology consultation with workup in progress 


carotid negative


MRI pending 








#HLD


statin therapy 





#CAD with Stents 


bb, stain, asa therapy 





#old left cva 


continue present mgmt














dc cancelled, will follow results of mRI, pt may need rehab per discussion with 

PPabloT


Pt discussed in rounds with entire interdisc team 





CANDY Yin 








BPCI/TIC





- BPCIA/TIC


Educated pt/family on BPCIA/CIR/Med to Bed Programs: N/A


Flyers given, including CMS Beneficiary letter: N/A


Pt/family verbalized understanding & agreed to program: N/A

## 2019-02-12 NOTE — CON
DATE:  02/12/2019



CHIEF COMPLAINT:  Right-sided weakness and paraesthesia.



SUBJECTIVE:  He currently has some right-sided paraesthesia still on _____

right side weakness of the right subtle pronator drift on the right upper

and lower extremity.  He had underwent MRI of the brain, results pending,

but did preliminary read by me showed an acute left pontomedullary junction

acute infarct which is likely secondary to diffuse atherosclerotic disease.

Carotid Doppler showed 20% and 39% possible ICA stenosis.  He was on

aspirin 81 prior to his CVA, we will recommend to be on dual antiplatelet

therapy.



PAST MEDICAL HISTORY:  History of severe residual left-sided facial droop,

coronary artery disease status post stent, hypertension, dyslipidemia,

celiac disease.



FAMILY HISTORY:  Noncontributory.



ALLERGIES:  NO KNOWN DRUG ALLERGIES.



SOCIAL HISTORY:  No illicit drug, smoking or EtOH abuse.



REVIEW OF SYSTEMS:  A 14-point review of system is negative except as per

HPI.



MEDICATIONS:  Reviewed by nurse reconciliation sheet.



LABORATORY DATA:  Sodium 138, potassium 4.6, chloride 107, carbon dioxide

25, BUN 23, creatinine 1.3 and random glucose 107.



PHYSICAL EXAMINATION:

GENERAL:  The patient is seen up in bed in no acute distress.

HEENT:  Head is atraumatic and normocephalic.  PERRLA.  Extraocular muscles

intact.

NECK:  Supple.  No JVD.  No adenopathy noted.

LUNGS:  Clear to auscultation.  No adventitious sounds.

HEART:  S1 and S2, normal rate and rhythm.  No murmur, rubs, or gallops.

ABDOMEN:  Soft and nontender.  Normoactive bowel sounds present.

EXTREMITIES:  No clubbing and no cyanosis.  Peripheral pulses are 2+

bilaterally.

NEUROLOGIC:  The patient is alert, oriented to person, place, month and

year.  Speech is fluent without any errors.  Cranial nerves II through XII

are intact.  Has residual left facial droop from prior CVA.  Otherwise,

cranial nerves II through XII are intact.  Motor exam:  Has a right-sided

upper and lower extremity pronator drift and has right 5/-5 right side

weakness when compared to left, left side intact.  Sensory:  Decreased

light touch and pinprick, proprioception and vibration are intact.  DTRs

are 2+ throughout.  Coordination:  Finger-to-nose is intact.  No dysmetria

noted.  Romberg negative.  Gait is normal.

VITAL SIGNS:  Blood pressure 160/70, respiratory rate 19, oxygen saturation

of 98% on room air and pulse rate of 64.



IMPRESSION AND PLAN:  Right side weakness and numbness, secondary to left

pontomedullary junction with acute infarct on the left, secondary to

diffuse atherosclerotic disease.  At this time, we recommend;

1.  Aspirin 81 mg, initially we will add Plavix 75 mg and atorvastatin 40

mg for stroke prevention.

2.  PT/OT evaluation possibly recommend subacute rehab/acute rehab for

right side weakness.

3.  Keep systolic blood pressure between 120s-130s and diastolic in

70s-80s.

4.  Continue current and present medical management and echocardiogram.







__________________________________________

Samuel Faulkner MD





DD:  02/12/2019 14:23:06

DT:  02/12/2019 15:23:43

Job # [id number]

## 2019-02-12 NOTE — MRI
Date of service: 



02/12/2019



PROCEDURE:  MRI BRAIN WITHOUT CONTRAST



HISTORY:

r/o cva



COMPARISON:

None available. 



TECHNIQUE:

Multiplanar, multisequence MR images of the brain were obtained 

without intravenous contrast enhancement.



FINDINGS:



HEMORRHAGE:

None



DWI:

There is a small acute infarct in the left side of the leonard.  This is 

seen on image 7 series 3



BRAIN PARENCHYMA:

Moderate atrophy.  Chronic microvascular changes are seen in the deep 

white matter



VENTRICLES:

Unremarkable. No hydrocephalus.



CRANIUM:

Unremarkable.



ORBITS:

Grossly unremarkable.



PARANASAL SINUSES/MASTOIDS:

Clear



VASCULAR SYSTEM:

Skull base flow voids intact.



OTHER FINDINGS:

None. 



IMPRESSION:

There is a small acute infarct in the left side of the leonard.

## 2019-02-13 VITALS — TEMPERATURE: 97.6 F | HEART RATE: 59 BPM | SYSTOLIC BLOOD PRESSURE: 133 MMHG | DIASTOLIC BLOOD PRESSURE: 64 MMHG

## 2019-02-13 LAB
ALBUMIN SERPL-MCNC: 3.7 G/DL (ref 3–4.8)
ALBUMIN/GLOB SERPL: 1.4 {RATIO} (ref 1.1–1.8)
ALT SERPL-CCNC: 9 U/L (ref 7–56)
AST SERPL-CCNC: 29 U/L (ref 17–59)
BASOPHILS # BLD AUTO: 0.01 K/MM3 (ref 0–2)
BASOPHILS NFR BLD: 0.1 % (ref 0–3)
BUN SERPL-MCNC: 23 MG/DL (ref 7–21)
CALCIUM SERPL-MCNC: 9 MG/DL (ref 8.4–10.5)
EOSINOPHIL # BLD: 0.1 10*3/UL (ref 0–0.7)
EOSINOPHIL NFR BLD: 1.5 % (ref 1.5–5)
ERYTHROCYTE [DISTWIDTH] IN BLOOD BY AUTOMATED COUNT: 14.4 % (ref 11.5–14.5)
GFR NON-AFRICAN AMERICAN: 48
HGB BLD-MCNC: 15.5 G/DL (ref 14–18)
LYMPHOCYTES # BLD: 3.2 10*3/UL (ref 1.2–3.4)
LYMPHOCYTES NFR BLD AUTO: 35 % (ref 22–35)
MCH RBC QN AUTO: 28.3 PG (ref 25–35)
MCHC RBC AUTO-ENTMCNC: 32.2 G/DL (ref 31–37)
MCV RBC AUTO: 87.8 FL (ref 80–105)
MONOCYTES # BLD AUTO: 0.9 10*3/UL (ref 0.1–0.6)
MONOCYTES NFR BLD: 10.3 % (ref 1–6)
PLATELET # BLD: 183 10^3/UL (ref 120–450)
PMV BLD AUTO: 10.3 FL (ref 7–11)
RBC # BLD AUTO: 5.48 10^6/UL (ref 3.5–6.1)
WBC # BLD AUTO: 9.2 10^3/UL (ref 4.5–11)

## 2019-02-13 RX ADMIN — METOPROLOL SUCCINATE SCH MG: 50 TABLET, EXTENDED RELEASE ORAL at 11:45

## 2019-02-13 RX ADMIN — PANTOPRAZOLE SODIUM SCH MG: 40 TABLET, DELAYED RELEASE ORAL at 11:45

## 2019-02-13 NOTE — CP.PCM.DIS
Provider





- Provider


Date of Admission: 


02/12/19 14:44





Attending physician: 


Clayton Schneider MD





Primary care physician: 


Mikel Escalante MD





Consults: 








02/11/19 06:35


Physician Consult Stat 


   Comment: 


   Consulting Provider: Samuel Faulkner


   Consulting Physician: Samuel Faulkner


   Reason for Consult: Paresthesias/numbness/right Arm/Leg





02/11/19 16:53


Social Work Referral Routine 


   Comment: d/c plan


   Physician Instructions: 


   Reason For Exam: eval











Time Spent in preparation of Discharge (in minutes): 50





Hospital Course





- Lab Results


Lab Results: 


                             Most Recent Lab Values











WBC  9.2 10^3/uL (4.5-11.0)   02/13/19  06:30    


 


RBC  5.48 10^6/uL (3.5-6.1)   02/13/19  06:30    


 


Hgb  15.5 g/dL (14.0-18.0)   02/13/19  06:30    


 


Hct  48.1 % (42.0-52.0)   02/13/19  06:30    


 


MCV  87.8 fl (80.0-105.0)   02/13/19  06:30    


 


MCH  28.3 pg (25.0-35.0)   02/13/19  06:30    


 


MCHC  32.2 g/dl (31.0-37.0)   02/13/19  06:30    


 


RDW  14.4 % (11.5-14.5)   02/13/19  06:30    


 


Plt Count  183 10^3/uL (120.0-450.0)   02/13/19  06:30    


 


MPV  10.3 fl (7.0-11.0)   02/13/19  06:30    


 


Neut % (Auto)  53.1 % (50.0-68.0)   02/13/19  06:30    


 


Lymph % (Auto)  35.0 % (22.0-35.0)   02/13/19  06:30    


 


Mono % (Auto)  10.3 % (1.0-6.0)  H  02/13/19  06:30    


 


Eos % (Auto)  1.5 % (1.5-5.0)   02/13/19  06:30    


 


Baso % (Auto)  0.1 % (0.0-3.0)   02/13/19  06:30    


 


Lymph # (Auto)  3.2  (1.2-3.4)   02/13/19  06:30    


 


Mono # (Auto)  0.9  (0.1-0.6)  H  02/13/19  06:30    


 


Eos # (Auto)  0.1  (0.0-0.7)   02/13/19  06:30    


 


Baso # (Auto)  0.01 K/mm3 (0.0-2.0)   02/13/19  06:30    


 


Absolute Neuts (auto)  4.87  (1.4-6.5)   02/13/19  06:30    


 


PT  11.2 SECONDS (9.4-12.5)   02/11/19  05:08    


 


INR  0.99   02/11/19  05:08    


 


APTT  29.6 Seconds (26.9-38.3)   02/11/19  05:08    


 


Sodium  139 mmol/L (132-148)   02/13/19  06:30    


 


Potassium  4.2 mmol/L (3.6-5.0)   02/13/19  06:30    


 


Chloride  108 mmol/L ()  H  02/13/19  06:30    


 


Carbon Dioxide  25 mmol/L (21-33)   02/13/19  06:30    


 


Anion Gap  11  (10-20)   02/13/19  06:30    


 


BUN  23 mg/dL (7-21)  H  02/13/19  06:30    


 


Creatinine  1.4 mg/dl (0.8-1.5)   02/13/19  06:30    


 


Est GFR ( Amer)  59   02/13/19  06:30    


 


Est GFR (Non-Af Amer)  48   02/13/19  06:30    


 


Random Glucose  106 mg/dL ()   02/13/19  06:30    


 


Hemoglobin A1c  6.2 % (4.2-6.5)   02/11/19  05:08    


 


Calcium  9.0 mg/dL (8.4-10.5)   02/13/19  06:30    


 


Total Bilirubin  0.7 mg/dL (0.2-1.3)   02/13/19  06:30    


 


AST  29 U/L (17-59)   02/13/19  06:30    


 


ALT  9 U/L (7-56)   02/13/19  06:30    


 


Alkaline Phosphatase  92 U/L ()   02/13/19  06:30    


 


Troponin I  < 0.01 ng/mL  02/11/19  05:08    


 


Total Protein  6.3 g/dL (5.8-8.3)   02/13/19  06:30    


 


Albumin  3.7 g/dL (3.0-4.8)   02/13/19  06:30    


 


Globulin  2.7 gm/dL  02/13/19  06:30    


 


Albumin/Globulin Ratio  1.4  (1.1-1.8)   02/13/19  06:30    


 


Triglycerides  124 mg/dL ()   02/11/19  05:08    


 


Cholesterol  126 mg/dL (130-200)  L  02/11/19  05:08    


 


LDL Cholesterol Direct  74 mg/dL (0-129)   02/11/19  05:08    


 


HDL Cholesterol  32 mg/dL (29-60)   02/11/19  05:08    














- Hospital Course


Hospital Course: 








83-year-old male with past medical history of CVA (L sided residual facial 

droop) , CAD with stents, hypertension, hyperlipidemia, celiac disease presents 

complaining of weakness and paresthesias of his right side. In the ED basic lab 

work was performed.  CT of the head was done and showed no intracranial 

abnormality.  Patient was admitted to the telemetry floor for close observation.

 Carotid ultrasound was performed and was unremarkable.  Echocardiography was 

performed as well.  An MRI of the brain was performed which showed an acute 

infarct in the L side of the leonard. Neurology was consulted for recommendations, 

recommended aspirin, Plavix and Lipitor.  Physical therapy evaluation 

recommended patient still has right-sided weakness and is off balance therefore 

patient would benefit from acute rehab.  Today the patient has mild right-sided 

weakness that has improved from yesterday.  He denies any other complaints at 

this time.





1. CVA w/ acute infarct in the L side of the leonard


2. CAD with stents


3  Hypertension


4. Hyperlipidemia


5. Celiac disease





Discharge Exam





- Head Exam


Head Exam: ATRAUMATIC, NORMOCEPHALIC





- Eye Exam


Eye Exam: EOMI, PERRL





- Respiratory Exam


Respiratory Exam: Clear to PA & Lateral.  absent: Wheezes





- Cardiovascular Exam


Cardiovascular Exam: REGULAR RHYTHM, RRR, +S1, +S2





- GI/Abdominal Exam


GI & Abdominal Exam: Normal Bowel Sounds, Soft.  absent: Tenderness





- Extremities Exam


Extremities exam: calf tenderness, pedal edema





- Neurological Exam


Neurological exam: Alert, Oriented x3


Additional comments: 





RUE and RLE 4/5 





- Psychiatric Exam


Psychiatric exam: Normal Mood





- Skin


Skin Exam: Dry, Warm





Discharge Plan





- Follow Up Plan


Condition: IMPROVED


Disposition: HOME/ ROUTINE


Patient education suggested?: Yes


Additional Instructions: 


Follow up with your PMD within 3 days


Follow-up with neurology within 1 week


If your symptoms continue please come back to the ED


Referrals: 


Mikel Escalante MD [Primary Care Provider] - 


Jun Faulkner MD [Staff Provider] -

## 2019-02-14 NOTE — CARD
--------------- APPROVED REPORT --------------





Date of service: 02/13/2019



EXAM: Two-dimensional and M-mode echocardiogram with Doppler and 

color Doppler.



INDICATION

CVA/TIA BUBBLE STUDY



2D DIMENSIONS 

Left Atrium (2D)3.8   (1.6-4.0cm)IVSd1.3   (0.7-1.1cm)

LVDd3.5   (3.9-5.9cm)LVOT Diameter2.0   (1.8-2.4cm)

PWd1.3   (0.7-1.1cm)LVDs2.4   (2.5-4.0cm)

FS (%) 32.5   %LVEF (%)61.9   (>50%)



M-Mode DIMENSIONS 

Aortic Root2.40   (2.2-3.7cm)Aortic Cusp Exc.0.60   (1.5-2.0cm)



Aortic Valve

AoV Peak Wjgtafwi583.0cm/sAoV VTI41.2cmAO Peak GR.20mmHg

LVOT Peak Hbpxlzdq437.0cm/sLVOT VTI27.80cmAO Mean GR.9mmHg

IRIS (VMAX)1.81jr1DME (VTI)2.12cm2



Mitral Valve

E/A ratio0.0



TDI

E/Lateral E'0.0E/Medial E'0.0



Tricuspid Valve

TR Peak Aybrsmha572sk/sRAP [id number] Peak Gr.29mmHg

[id number]



 LEFT VENTRICLE 

The left ventricle is normal size. There is mild concentric left 

ventricular hypertrophy. The left ventricular function is normal. The 

left ventricular ejection fraction is within the normal range. There 

is normal LV segmental wall motion.



 RIGHT VENTRICLE 

The right ventricle is normal size. The right ventricular systolic 

function is normal.



 ATRIA 

The left atrium is mildly dilated. The right atrium size is normal. 

The interatrial septum is intact with no evidence for an atrial 

septal defect.



 AORTIC VALVE 

The aortic valve is moderately calcified. No aortic regurgitation is 

present. There is mild to moderate valvular aortic stenosis.



 MITRAL VALVE 

Mitral annular calcification is mild. Mitral regurgitation is mild.



 TRICUSPID VALVE 

The tricuspid valve is normal in structure. There is mild tricuspid 

regurgitation.



 PULMONIC VALVE 

The pulmonary valve is normal in structure.



 GREAT VESSELS 

The aortic root is normal in size. The IVC is normal in size and 

collapses >50% with inspiration.



 PERICARDIAL EFFUSION 

There is no pleural effusion. There is no pericardial effusion.



<Conclusion>

Dilated LA.

Normal LV size and systolic function.

Miild concentric LVH.

Mild to moderate AS.

Mild MR and TR.

Negative bubble study.

## 2019-03-08 ENCOUNTER — HOSPITAL ENCOUNTER (EMERGENCY)
Dept: HOSPITAL 42 - ED | Age: 84
Discharge: HOME | End: 2019-03-08
Payer: MEDICARE

## 2019-03-08 VITALS — SYSTOLIC BLOOD PRESSURE: 171 MMHG | OXYGEN SATURATION: 98 % | DIASTOLIC BLOOD PRESSURE: 83 MMHG | HEART RATE: 66 BPM

## 2019-03-08 VITALS — BODY MASS INDEX: 27.3 KG/M2

## 2019-03-08 VITALS — TEMPERATURE: 97.4 F | RESPIRATION RATE: 18 BRPM

## 2019-03-08 DIAGNOSIS — I10: Primary | ICD-10-CM

## 2019-03-08 DIAGNOSIS — E78.5: ICD-10-CM

## 2019-03-08 DIAGNOSIS — I25.10: ICD-10-CM

## 2019-03-08 LAB
ALBUMIN SERPL-MCNC: 4 G/DL (ref 3–4.8)
ALBUMIN/GLOB SERPL: 1.4 {RATIO} (ref 1.1–1.8)
ALT SERPL-CCNC: 12 U/L (ref 7–56)
AST SERPL-CCNC: 25 U/L (ref 17–59)
BASOPHILS # BLD AUTO: 0.02 K/MM3 (ref 0–2)
BASOPHILS NFR BLD: 0.2 % (ref 0–3)
BUN SERPL-MCNC: 22 MG/DL (ref 7–21)
CALCIUM SERPL-MCNC: 9.2 MG/DL (ref 8.4–10.5)
EOSINOPHIL # BLD: 0.2 10*3/UL (ref 0–0.7)
EOSINOPHIL NFR BLD: 2.3 % (ref 1.5–5)
ERYTHROCYTE [DISTWIDTH] IN BLOOD BY AUTOMATED COUNT: 15.2 % (ref 11.5–14.5)
GFR NON-AFRICAN AMERICAN: 41
HGB BLD-MCNC: 14.1 G/DL (ref 14–18)
LYMPHOCYTES # BLD: 2.8 10*3/UL (ref 1.2–3.4)
LYMPHOCYTES NFR BLD AUTO: 33.7 % (ref 22–35)
MCH RBC QN AUTO: 28.7 PG (ref 25–35)
MCHC RBC AUTO-ENTMCNC: 32 G/DL (ref 31–37)
MCV RBC AUTO: 89.6 FL (ref 80–105)
MONOCYTES # BLD AUTO: 0.8 10*3/UL (ref 0.1–0.6)
MONOCYTES NFR BLD: 9.1 % (ref 1–6)
PLATELET # BLD: 260 10^3/UL (ref 120–450)
PMV BLD AUTO: 9.2 FL (ref 7–11)
RBC # BLD AUTO: 4.92 10^6/UL (ref 3.5–6.1)
WBC # BLD AUTO: 8.2 10^3/UL (ref 4.5–11)

## 2019-03-08 NOTE — RAD
Date of service: 



03/08/2019



HISTORY:

 pneumonia 



COMPARISON:

02/11/2019



TECHNIQUE:

Chest PA and lateral



FINDINGS:



LUNGS:

No active pulmonary disease.



PLEURA:

No significant pleural effusion identified. No pneumothorax apparent.



CARDIOVASCULAR:

Aortic calcification



Normal cardiac size. No pulmonary vascular congestion. 



OSSEOUS STRUCTURES:

No significant abnormalities.



VISUALIZED UPPER ABDOMEN:

Normal.



OTHER FINDINGS:

None.



IMPRESSION:

No active disease.

## 2019-03-08 NOTE — ED PDOC
Arrival/HPI





- General


Chief Complaint: High Blood Pressure


Time Seen by Provider: 03/08/19 09:27


Historian: Patient, Spouse





- History of Present Illness


Narrative History of Present Illness (Text): 





03/08/19 9:53


83 year old male, whose past medical history includes CVA (L sided residual 

facial droop) , CAD with stents, hypertension, hyperlipidemia, celiac disease, 

presents to the emergency department accompanied by spouse for evaluation of 

elevated blood pressure today. Patient reports his visiting nurse checked his 

blood pressure today which was 200/100 where his PMD was notified and told to go

to the ER for evaluation. Patient denies any associated symptoms. He reports he 

saw Dr. Escalante yesterday and his blood pressure was within its normal range. 

Patient admits to a history of cough, but denies any fever, chills, chest pain, 

shortness of breath, nausea, vomiting, diarrhea, urinary symptoms, back pain, 

neck pain, headache, dizziness, or any other complaints. 





PMD: Dr. Escalante





Time/Duration: Other (today)


Symptom Onset: Gradual


Symptom Course: Unchanged


Activities at Onset: Light


Context: Home





Past Medical History





- Provider Review


Nursing Documentation Reviewed: Yes





- Infectious Disease


Hx of Infectious Diseases: None





- Tetanus Immunization


Tetanus Immunization: Unknown





- Cardiac


Hx Cardiac Disorders: Yes (CAD with stents)


Hx Hypertension: Yes





- Pulmonary


Hx Respiratory Disorders: No





- Neurological


HX Cerebrovascular Accident: Yes (with L sided facial droop)





- HEENT


Hx HEENT Disorder: No





- Endocrine/Metabolic


Hx Endocrine Disorders: No





- Hematological/Oncological


Hx Blood Disorders: Yes


Hx Cancer: Yes (basal cell removed from neck)





- Integumentary


Hx Dermatological Disorder: Yes


Other/Comment: dry skin and dry thick toenails to feet





- Musculoskeletal/Rheumatological


Hx Musculoskeletal Disorders: No


Hx Falls: No





- Gastrointestinal


Hx Gastrointestinal Disorders: Yes (diverticulosis, colon polyps removed)


Other/Comment: celiac disease, endoscopy 4/3/19 dx celiac disease/gastric 

polyps/schatzki ring/hiatal hernia/healed esophageal ulcer





- Genitourinary/Gynecological


Hx Prostate Problems: Yes (bph on flomax)





- Psychiatric


Hx Psychophysiologic Disorder: No


Hx Emotional Abuse: No


Hx Physical Abuse: No


Hx Substance Use: No





- Surgical History


Hx Cardiac Catheterization: Yes (with stents x4)


Hx Coronary Stent: Yes (x4)





- Anesthesia


Hx Anesthesia: Yes


Hx Anesthesia Reactions: No


Hx Malignant Hyperthermia: No





- Suicidal Assessment


Feels Threatened In Home Enviroment: No





Family/Social History





- Physician Review


Nursing Documentation Reviewed: Yes


Family/Social History: No Known Family HX


Smoking Status: Never Smoked


Hx Alcohol Use: No


Hx Substance Use: No





Allergies/Home Meds


Allergies/Adverse Reactions: 


Allergies





No Known Allergies Allergy (Verified 03/08/19 09:53)


   








Home Medications: 


                                    Home Meds











 Medication  Instructions  Recorded  Confirmed


 


Metoprolol Succinate 50 mg PO DAILY 06/20/12 03/08/19


 


Ferrous Gluconate [Fergon] 270 mg PO DAILY 10/13/17 03/08/19


 


Folic Acid 1 mg PO BID 10/13/17 03/08/19


 


Tamsulosin [Flomax] 0.4 mg PO HS 10/13/17 03/08/19


 


Aspirin [Ecotrin] 81 mg PO DAILY 01/03/18 03/08/19


 


Atorvastatin [Lipitor] 20 mg PO QOTHERDAY 01/03/18 03/08/19


 


Docusate Sodium [Stool Softener] 100 mg PO DAILY 01/03/18 03/08/19


 


Losartan Potassium [Cozaar] 100 mg PO DAILY 01/03/18 03/08/19


 


Multivit-Minerals/FA/Lycopene [One 1 tab PO DAILY 01/03/18 03/08/19





Daily For Men Tablet]   


 


amLODIPine [Norvasc] 10 mg PO QAM 02/11/19 03/08/19


 


Hydralazine HCl 25 mg PO TID 03/08/19 03/08/19


 


Ranitidine HCl [Acid Reducer 150] 150 mg PO BID 03/08/19 03/08/19


 


Terazosin [Hytrin] 2 mg PO HS 03/08/19 03/08/19














Review of Systems





- Physician Review


All systems were reviewed & negative as marked: Yes





- Review of Systems


Constitutional: absent: Fevers, Other (chills)


Respiratory: SOB, Cough


Cardiovascular: absent: Chest Pain


Gastrointestinal: absent: Diarrhea, Nausea, Vomiting


Genitourinary Male: absent: Dysuria, Frequency, Hematuria


Musculoskeletal: absent: Back Pain, Neck Pain


Neurological: absent: Headache, Dizziness





Physical Exam





- Physical Exam


Narrative Physical Exam (Text): 





Gen: VS reviewed, alert, well developed, well nourished, nontoxic, mild 

distress.


ENT: normal pharynx.


Eye: EOMI, PERRL.


Neck: no JVD, supple, no adenopathy.


CV: regular rate, regular rhythm, no rubs, no murmur, no gallops, S1, S2, pulses

 equal and strong.


Pulm: Rhonchi bilateral lung field. no distress, no wheeze, breath sounds equal,

 no rales.


Abd: soft, nontender, no guarding, no rebound, no rigidity, normal bowel sounds.


Ext: trace edema bilateral LE 


Skin: good color, no rash, no cyanosis.


Psych: responds appropriately to questions, normal affect.


Neuro: oriented x 3, CN2-12 intact grossly, motor intact, sensation intact.








Vital Signs Reviewed: Yes





Vital Signs











  Temp Pulse Resp BP Pulse Ox


 


 03/08/19 09:37  97.4 F L  62  18  154/64 H  96











Temperature: Afebrile


Blood Pressure: Hypertensive


Pulse: Regular


Respiratory Rate: Normal





Medical Decision Making


ED Course and Treatment: 





03/08/19 09:26


Impression:


83 year old male sent in by Dr. Escalante for elevated blood pressure today 

(200/100) and also admits to history of cough. 





Plan:


-- Labs


-- Chest X-ray 


-- Reassess and disposition





Prior Visits:


Notes and results from previous visits were reviewed. 





Progress Notes:











03/08/19 12:12


re-eval of lung exam: there is no respi distress, no wheezing, coarse breath 

sounds auscultated previously have improved without intervention, will continue 

with neb tx and reassess. blood pressure remains stable at this time. patient 

reports he has had some upper respiratory congestion.


03/08/19 12:43


cxr clear, lung sounds normalized, blood pressure elevated but stable. patient 

will be dc home and will call pcp for close outpt follow up for blood pressure.





- Lab Interpretations


I have reviewed the lab results: Yes





- RAD Interpretation


Radiology Orders: 











03/08/19 09:54


CXR [CHEST TWO VIEWS (PA/LAT)] [RAD] Stat 











: Radiologist





- Scribe Statement


The provider has reviewed the documentation as recorded by the Dominiqueibe





Shalonda Esparza





Provider Scribe Attestation:


All medical record entries made by the Scribe were at my direction and 

personally dictated by me. I have reviewed the chart and agree that the record 

accurately reflects my personal performance of the history, physical exam, 

medical decision making, and the department course for this patient. I have also

 personally directed, reviewed, and agree with the discharge instructions and 

disposition.








Disposition/Present on Arrival





- Present on Arrival


Any Indicators Present on Arrival: No


History of DVT/PE: No


History of Uncontrolled Diabetes: No


Urinary Catheter: No


History of Decub. Ulcer: No


History Surgical Site Infection Following: None





- Disposition


Have Diagnosis and Disposition been Completed?: Yes


Diagnosis: 


 Hypertension





Disposition: HOME/ ROUTINE


Disposition Time: 12:44


Patient Plan: Discharge


Condition: STABLE


Discharge Instructions (ExitCare):  High Blood Pressure in Adults


Additional Instructions: 


follow up with your primary care doctor as soon as possible.


Referrals: 


Mikel Escalante MD [Primary Care Provider] - Follow up with primary


Forms:  [employer] (English)